# Patient Record
Sex: MALE | URBAN - METROPOLITAN AREA
[De-identification: names, ages, dates, MRNs, and addresses within clinical notes are randomized per-mention and may not be internally consistent; named-entity substitution may affect disease eponyms.]

---

## 2017-05-24 NOTE — ED PROVIDER NOTES
Patient Seen in: [facility] Emergency Department    History   Patient presents with:  Nausea/Vomiting/Diarrhea (gastrointestinal)  Dehydration (metabolic/constitutional)  Eval-P (psychiatric)    Stated Complaint: opiate abuse, sent for withdrawl sym MG Oral Tab CR,  Take 1 tablet (30 mg total) by mouth every 12 (twelve) hours.  For 23 days until seen by another Pain Clinic   OMEPRAZOLE 40 MG Oral Capsule Delayed Release,  TAKE ONE CAPSULE BY MOUTH EVERY DAY   amphetamine-dextroamphetamine (ADDERALL) 5 PANEL (14) - Abnormal; Notable for the following:     Glucose 170 (*)     Creatinine 1.36 (*)     Total Protein 9.1 (*)     Potassium 3.4 (*)     CO2 19.0 (*)     All other components within normal limits   HEMOGLOBIN A1C - Abnormal; Notable for the follow

## 2017-05-25 NOTE — BH LEVEL OF CARE ASSESSMENT
Level of Care Assessment Note    General Questions  Why are you here?: Pt reports detox from multiple opiates, withdrawl since Monday 5/22/17.   Precipitating Events: Pt reported for the past 14 years taking 50mg Fentanyl, Morphine, and  Norco 2x per Collateral Provided By[de-identified] None present    Danger to Others/Property  Current/Recent Harm Toward Others: No  Past Harm Toward Others: Refused to discuss  Current or Past Harm Toward Animals: No  History or Allegations of Inappropriate Physical Contact: No  C Irritable; Anxious  Affect: Blunted  Eye Contact: Fair  Level of Consciousness: Alert  Exhibited Behavior : Fidgety;Participated; Appropriate to situation  Gait/Movement: Steady; Coordinated  Speech Characteristics: Normal rate;Normal rhythm;Normal volume  Co

## 2017-06-06 ENCOUNTER — CHARTING TRANS (OUTPATIENT)
Dept: INTERNAL MEDICINE | Age: 52
End: 2017-06-06

## 2017-06-27 ENCOUNTER — CHARTING TRANS (OUTPATIENT)
Dept: PAIN MANAGEMENT | Age: 52
End: 2017-06-27

## 2017-07-05 ENCOUNTER — CHARTING TRANS (OUTPATIENT)
Dept: OTHER | Age: 52
End: 2017-07-05

## 2017-07-11 ENCOUNTER — CHARTING TRANS (OUTPATIENT)
Dept: INTERNAL MEDICINE | Age: 52
End: 2017-07-11

## 2017-07-13 ENCOUNTER — CHARTING TRANS (OUTPATIENT)
Dept: SURGERY | Age: 52
End: 2017-07-13

## 2017-07-13 ENCOUNTER — CHARTING TRANS (OUTPATIENT)
Dept: OTHER | Age: 52
End: 2017-07-13

## 2017-07-20 ENCOUNTER — CHARTING TRANS (OUTPATIENT)
Dept: PAIN MANAGEMENT | Age: 52
End: 2017-07-20

## 2017-07-21 ENCOUNTER — CHARTING TRANS (OUTPATIENT)
Dept: OTHER | Age: 52
End: 2017-07-21

## 2018-07-16 PROBLEM — S43.422A SPRAIN OF LEFT ROTATOR CUFF CAPSULE, INITIAL ENCOUNTER: Status: ACTIVE | Noted: 2018-07-16

## 2018-11-23 ENCOUNTER — IMAGING SERVICES (OUTPATIENT)
Dept: OTHER | Age: 53
End: 2018-11-23

## 2018-11-28 VITALS — WEIGHT: 193 LBS | HEIGHT: 69 IN | BODY MASS INDEX: 28.58 KG/M2

## 2018-11-28 VITALS
DIASTOLIC BLOOD PRESSURE: 100 MMHG | HEIGHT: 68 IN | WEIGHT: 179 LBS | BODY MASS INDEX: 27.13 KG/M2 | SYSTOLIC BLOOD PRESSURE: 160 MMHG | TEMPERATURE: 98.4 F

## 2018-11-28 VITALS
SYSTOLIC BLOOD PRESSURE: 138 MMHG | HEIGHT: 67 IN | OXYGEN SATURATION: 97 % | HEART RATE: 86 BPM | DIASTOLIC BLOOD PRESSURE: 92 MMHG | BODY MASS INDEX: 30.13 KG/M2 | WEIGHT: 192 LBS

## 2018-11-28 VITALS — WEIGHT: 181 LBS | BODY MASS INDEX: 27.43 KG/M2 | HEIGHT: 68 IN

## 2018-11-28 VITALS
OXYGEN SATURATION: 97 % | TEMPERATURE: 97.7 F | BODY MASS INDEX: 28.09 KG/M2 | SYSTOLIC BLOOD PRESSURE: 138 MMHG | DIASTOLIC BLOOD PRESSURE: 96 MMHG | HEIGHT: 67 IN | WEIGHT: 179 LBS | HEART RATE: 100 BPM

## 2019-01-24 ENCOUNTER — IMAGING SERVICES (OUTPATIENT)
Dept: OTHER | Age: 54
End: 2019-01-24

## 2019-04-10 PROBLEM — S43.422D SPRAIN OF LEFT ROTATOR CUFF CAPSULE, SUBSEQUENT ENCOUNTER: Status: ACTIVE | Noted: 2019-04-10

## 2020-02-14 ENCOUNTER — OFFICE VISIT (OUTPATIENT)
Dept: PHYSICAL THERAPY | Age: 55
End: 2020-02-14
Attending: ORTHOPAEDIC SURGERY
Payer: OTHER MISCELLANEOUS

## 2020-02-14 DIAGNOSIS — S83.512A RUPTURE OF ANTERIOR CRUCIATE LIGAMENT OF LEFT KNEE, INITIAL ENCOUNTER: ICD-10-CM

## 2020-02-14 PROCEDURE — 97161 PT EVAL LOW COMPLEX 20 MIN: CPT

## 2020-02-14 PROCEDURE — 97110 THERAPEUTIC EXERCISES: CPT

## 2020-02-14 NOTE — PROGRESS NOTES
INITIAL EVALUATION:   Referring Physician: Dr. Elizabeth Trujillo  Diagnosis: ACL tear, medial meniscal tear, lateral meniscal tear. (RIGHT)     Notes:  -Preop range of motion and strengthening prior to ACL reconstruction left knee.   -Also has minimally displaced Observation/gait/posture:   -Antalgic with transitioning to sitting to standing/standing to sitting with decrease weight bearing R LE and limited ROM  -Ambulates with bilateral axillary crutches with minimal weight bearing R LE  AROM/overpressure:   -Sup Duration:   -Two times per week for 3 weeks  -Impairment based exercise progression  -Soft tissue mobilization  -Patella femoral mobilization  -Electrical stimulation for symptom modulation and to improve muscle performance   -Limited weight bearing    Edu

## 2020-02-17 ENCOUNTER — TELEPHONE (OUTPATIENT)
Dept: PHYSICAL THERAPY | Age: 55
End: 2020-02-17

## 2020-02-19 ENCOUNTER — TELEPHONE (OUTPATIENT)
Dept: PHYSICAL THERAPY | Age: 55
End: 2020-02-19

## 2020-02-19 NOTE — TELEPHONE ENCOUNTER
Called patient to offer appointment tines 2/20 and 2/21; left voice mail message as patient was not available

## 2020-02-24 ENCOUNTER — APPOINTMENT (OUTPATIENT)
Dept: PHYSICAL THERAPY | Age: 55
End: 2020-02-24
Attending: ORTHOPAEDIC SURGERY
Payer: OTHER MISCELLANEOUS

## 2020-02-26 ENCOUNTER — TELEPHONE (OUTPATIENT)
Dept: PHYSICAL THERAPY | Age: 55
End: 2020-02-26

## 2020-02-26 NOTE — TELEPHONE ENCOUNTER
Patient no show for physical therapy appointment today 2/26/2020. LVM to confirm next appointment. Patient has cancelled 1 and no shown 2 visits since initial evaluation on 2/14/2020.

## 2020-03-03 ENCOUNTER — HOSPITAL ENCOUNTER (OUTPATIENT)
Dept: GENERAL RADIOLOGY | Age: 55
Discharge: HOME | End: 2020-03-03
Attending: ORTHOPAEDIC SURGERY
Payer: COMMERCIAL

## 2020-03-03 DIAGNOSIS — S82.101A CLOSED FRACTURE OF PROXIMAL END OF RIGHT TIBIA, UNSPECIFIED FRACTURE MORPHOLOGY, INITIAL ENCOUNTER: ICD-10-CM

## 2020-03-03 PROCEDURE — 73560 X-RAY EXAM OF KNEE 1 OR 2: CPT | Performed by: ORTHOPAEDIC SURGERY

## 2020-03-04 ENCOUNTER — APPOINTMENT (OUTPATIENT)
Dept: PHYSICAL THERAPY | Age: 55
End: 2020-03-04
Attending: ORTHOPAEDIC SURGERY
Payer: OTHER MISCELLANEOUS

## 2020-03-09 ENCOUNTER — APPOINTMENT (OUTPATIENT)
Dept: PHYSICAL THERAPY | Age: 55
End: 2020-03-09
Attending: ORTHOPAEDIC SURGERY
Payer: OTHER MISCELLANEOUS

## 2020-03-11 ENCOUNTER — APPOINTMENT (OUTPATIENT)
Dept: PHYSICAL THERAPY | Age: 55
End: 2020-03-11
Attending: ORTHOPAEDIC SURGERY
Payer: OTHER MISCELLANEOUS

## 2020-06-25 PROBLEM — Z98.890 S/P ARTHROSCOPY OF RIGHT KNEE: Status: ACTIVE | Noted: 2020-06-25

## 2020-12-03 ENCOUNTER — HOSPITAL ENCOUNTER (EMERGENCY)
Facility: HOSPITAL | Age: 55
Discharge: HOME | End: 2020-12-03
Attending: EMERGENCY MEDICINE
Payer: COMMERCIAL

## 2020-12-03 ENCOUNTER — APPOINTMENT (OUTPATIENT)
Dept: GENERAL RADIOLOGY | Facility: HOSPITAL | Age: 55
End: 2020-12-03
Attending: EMERGENCY MEDICINE
Payer: COMMERCIAL

## 2020-12-03 VITALS
HEART RATE: 102 BPM | SYSTOLIC BLOOD PRESSURE: 161 MMHG | OXYGEN SATURATION: 97 % | HEIGHT: 68 IN | WEIGHT: 185 LBS | DIASTOLIC BLOOD PRESSURE: 100 MMHG | BODY MASS INDEX: 28.04 KG/M2 | RESPIRATION RATE: 12 BRPM | TEMPERATURE: 98 F

## 2020-12-03 DIAGNOSIS — B34.9 VIRAL SYNDROME: Primary | ICD-10-CM

## 2020-12-03 PROCEDURE — 99284 EMERGENCY DEPT VISIT MOD MDM: CPT

## 2020-12-03 PROCEDURE — 71045 X-RAY EXAM CHEST 1 VIEW: CPT | Performed by: EMERGENCY MEDICINE

## 2020-12-03 PROCEDURE — 82550 ASSAY OF CK (CPK): CPT | Performed by: EMERGENCY MEDICINE

## 2020-12-03 PROCEDURE — 96361 HYDRATE IV INFUSION ADD-ON: CPT

## 2020-12-03 PROCEDURE — 96360 HYDRATION IV INFUSION INIT: CPT

## 2020-12-03 PROCEDURE — 84145 PROCALCITONIN (PCT): CPT | Performed by: EMERGENCY MEDICINE

## 2020-12-03 PROCEDURE — 85025 COMPLETE CBC W/AUTO DIFF WBC: CPT | Performed by: EMERGENCY MEDICINE

## 2020-12-03 PROCEDURE — 83615 LACTATE (LD) (LDH) ENZYME: CPT | Performed by: EMERGENCY MEDICINE

## 2020-12-03 PROCEDURE — 82728 ASSAY OF FERRITIN: CPT | Performed by: EMERGENCY MEDICINE

## 2020-12-03 PROCEDURE — 86140 C-REACTIVE PROTEIN: CPT | Performed by: EMERGENCY MEDICINE

## 2020-12-03 PROCEDURE — 85379 FIBRIN DEGRADATION QUANT: CPT | Performed by: EMERGENCY MEDICINE

## 2020-12-03 PROCEDURE — 84484 ASSAY OF TROPONIN QUANT: CPT | Performed by: EMERGENCY MEDICINE

## 2020-12-03 PROCEDURE — 80053 COMPREHEN METABOLIC PANEL: CPT | Performed by: EMERGENCY MEDICINE

## 2020-12-03 PROCEDURE — 83880 ASSAY OF NATRIURETIC PEPTIDE: CPT | Performed by: EMERGENCY MEDICINE

## 2020-12-03 RX ORDER — LISINOPRIL 40 MG/1
40 TABLET ORAL DAILY
Status: DISCONTINUED | OUTPATIENT
Start: 2020-12-03 | End: 2020-12-03

## 2020-12-03 RX ORDER — HYDROCHLOROTHIAZIDE 25 MG/1
25 TABLET ORAL DAILY
Status: DISCONTINUED | OUTPATIENT
Start: 2020-12-03 | End: 2020-12-03

## 2020-12-03 RX ORDER — AMLODIPINE BESYLATE 5 MG/1
10 TABLET ORAL DAILY
Status: DISCONTINUED | OUTPATIENT
Start: 2020-12-03 | End: 2020-12-03

## 2020-12-03 NOTE — ED NOTES
Pt presented to [facility] ED from [facility] doctor for Covid\". Pt refusing to participate in interview and states he won't answer questions regarding his mental with a \"civilian\".  Pt reports having therapist and psychiatrist through [facility] and state

## 2020-12-03 NOTE — ED PROVIDER NOTES
Patient Seen in: [facility] Emergency Department      History   Patient presents with:  Covid    Stated Complaint: covid    HPI    56-year-old male comes to the hospital with a complaint of having difficulty with a cough, body aches, chills, weaknes src Temporal   SpO2 96 %   O2 Device None (Room air)       Current:BP (!) 171/103   Pulse 105   Temp 98.2 °F (36.8 °C) (Temporal)   Resp 15   Ht 172.7 cm (5' 8\")   Wt 83.9 kg   SpO2 97%   BMI 28.13 kg/m²         Physical Exam    HEENT : NCAT, EOMI, PEERL, -----------         ------                     CBC W/ DIFFERENTIAL[[id number]]          Abnormal            Final result                 Please view results for these tests on the individual orders.    RAINBOW DRAW BLUE   RAINBOW D

## 2020-12-03 NOTE — ED INITIAL ASSESSMENT (HPI)
Pt is tearful in room, reports symptoms started 3 weeks, was tested throught [place], [place] and [employer] for covid and it came up positive \"I dont believe it because there are many false positive readings. \"  Pt reports sob, fever \"a little over 100 temp\", and chills

## 2020-12-03 NOTE — ED NOTES
Pt was informed that we have a  that can come and talk to him regarding his depression. Patient states \"I am not talking to a shrink. I already have a psychiatrist that I speak to. I know that they will just take my guns away.   No, I will no

## 2020-12-04 NOTE — ED NOTES
Patient reports feeling stressed out about being out of work d/t ACL surgery and being Covid positive. Patient states \"I just want to go back to normal\". Patient still tearful in room.  Patient was instructed to follow up with his therapist and providers

## 2021-01-06 PROBLEM — T16.1XXA EAR FOREIGN BODY, RIGHT, INITIAL ENCOUNTER: Status: ACTIVE | Noted: 2021-01-06

## 2021-02-01 ENCOUNTER — HOSPITAL ENCOUNTER (OUTPATIENT)
Dept: GENERAL RADIOLOGY | Age: 56
Discharge: HOME | End: 2021-02-01
Attending: INTERNAL MEDICINE
Payer: COMMERCIAL

## 2021-02-01 DIAGNOSIS — S82.891S CLOSED FRACTURE OF RIGHT ANKLE, SEQUELA: ICD-10-CM

## 2021-02-01 PROCEDURE — 73590 X-RAY EXAM OF LOWER LEG: CPT | Performed by: INTERNAL MEDICINE

## 2021-02-01 PROCEDURE — 73610 X-RAY EXAM OF ANKLE: CPT | Performed by: INTERNAL MEDICINE

## (undated) NOTE — ED AVS SNAPSHOT
[facility] Emergency Department    [place]  [address] [address]    Phone:  [phone]    Fax:  [phone] [facility]   MRN: [id number]    Department:  [facility] Emergency Department   Date of Visit:  5/24 [phone]       To Check ER Wait Times:  TEXT 'ERwait' to [id number]      Click [email or web address]      Or call [phone]    If you have any problems with your follow-up, please call our  at [phone]    Douglas rodriguez problema con I have read and understand the instructions given to me by my caregivers. [facility]        Pharmacy Address Phone Number   [phone] ([address]) [place]

## (undated) NOTE — LETTER
Patient Name: Francine Donald  YOB: 1965          MRN number:  [id number]  Date:  2/14/2020  Referring Physician:  Geneva Gibbons         INITIAL EVALUATION:    Referring Physician: Dr. Octavio Correa  Diagnosis: ACL tear, medial meniscal tear, la [place] describes prior level of function:  -Denies limitation  Pt goals include:  -Decrease pain level; improve range of motion; improve strength    OBJECTIVE:   Observation/gait/posture:   -Antalgic with transitioning to sitting to standing/standing to s -Patient able to demonstrate sit/stand -stand/sit transition with minimal pain complaint  -Patient able to demonstrate understanding with minimal cueing for home program    Frequency / Duration:   -Two times per week for 3 weeks  -Impairment based exercise

## (undated) NOTE — ED AVS SNAPSHOT
[facility] Emergency Department    [place]  [address] [address]    Phone:  [phone]    Fax:  [phone] [facility]   MRN: [id number]    Department:  [facility] Emergency Department   Date of Visit:  5/24 IF THERE IS ANY CHANGE OR WORSENING OF YOUR CONDITION, CALL YOUR PRIMARY CARE PHYSICIAN AT ONCE OR RETURN IMMEDIATELY TO THE EMERGENCY DEPARTMENT.     If you have been prescribed any medication(s), please fill your prescription right away and begin taking t